# Patient Record
(demographics unavailable — no encounter records)

---

## 2024-10-14 NOTE — ASSESSMENT
[FreeTextEntry1] : Impression soft tissue irritation and soreness potentially related to flareup from physical therapy exercises.  Very complex patient with respect to overlying back and hip issues which seems to be more so back by far than hip and what seems to be exacerbation of her pre-existing neuropathic changes related to the chemotherapy drugs from the past and the current overlying and what seems to be somewhat progressive central canal stenosis.  I did review the images that she had done recently of the MRI with Dr. Martin who does agree that she has relatively significant tight canal stenosis centrally at that level and that it could be contributing to what is going on.  He agreed with me that a potential EMG nerve conduction study would be appropriate and I placed a referral for same.  I suggested she hold off on physical therapy try a short course of Celebrex and see since she is not currently on an anti-inflammatory that adding that to the regimen that she currently is on might be beneficial.  She is certainly willing to try this we will get the EMG and we will follow-up with either Dr. Martin or myself in the coming weeks.  Please excuse any errors in this note is completed with Dragon software.  Time for preparation of this visit was approximately 30 minutes in reviewing her images.  Face-to-face interaction was 30 minutes in documentation 10 minutes so a total of 70 minutes on this patient's visit.

## 2024-10-14 NOTE — HISTORY OF PRESENT ILLNESS
[de-identified] : Keri comes in today as a new patient for the evaluation of left sided hip pain.  She has a very pertinent prior history of evaluations inclusive of her lumbosacral spine as well as her pelvis.  These of both been imaged extensively through the [employer] system and I have access to those.  I spent a significant amount of time prior to seeing the patient reviewing her plain images of of her lumbar spine as well as the MRI images of both the lumbar spine and the pelvis.  The only finding on her pelvis was that of a potential labral fraying or tearing. Very significant multilevel disc degenerative changes noted in the lumbar spine with a comparison made by the radiologist and the reading to prior images that suggest some progression especially the central stenosis at the region of 3 4.  3445 show significant changes as for is anterolisthesis on 5 and there is retrolisthesis at the multiple levels above L4.  None of this is greater than a grade 1 listhesis.  Despite this there is very significant central canal stenosis noted.  The patient does complain not just of some muscular pain overlying the left hip and discomfort in the gluteal origin region of the iliac crest, but also has a remote history of breast cancer subsequent breast cancer treatment with medications that have left her with neuropathy.  She feels that her leg neuropathy has has somewhat progressed and my concern as to whether or not this was somehow linked to the central stenosis potentially contributing to the neuropathy progressing.  She has not had progression in the neuropathy in her hands that she had from the chemotherapy.  She also relays a significant history of multiple falls that she says is related to the neuropathy.  She has been through physical therapy and feels that the therapy may have aggravated the left hip musculature as they are doing a significant amount of relatively high level work using balancing Bosu balls as well as hip abduction exercises with resistance bands and other relatively high-level activities.  She denies any significant current sciatica has been through epidural steroid injections as well as extensive physical therapy thus far and is interested in how she might be treated further.  She is on 60 mg dose of duloxetine daily and takes Lyrica equivalent pregabalin 75 mg twice a day.  We did have a discussion today about the Lyrica and the Lyrica does have a potential higher dose options however there is the slightest concern for combination with duloxetine and Lyrica relating to serotonin syndrome.  She has previously tried gabapentin but from what it sounds like it was only at the 300 mg dose so neither the Lyrica nor gabapentin thus far have been maximized as far as treatment options for her neuropathy.
(1) Other Diagnosis

## 2024-10-14 NOTE — PHYSICAL EXAM
[de-identified] : Physical exam shows a younger appearing than stated age female no acute distress very pleasant interactive and a very good historian.  He has negative straight leg raises some tenderness overlying the left hip musculature extending from the origin of the glue gluteal musculature extending distally towards the tip of the greater trochanter not tender overlying the greater trochanter.  Some of this discomfort and soreness is recreated with some range of motion of the hip which though is maintained and full compared to the right with internal rotation of about 10 and external Tatian about 70 the extremes of rotation do cause her pain.  Distally she is neurovascularly intact from the standpoint of strength however does describe generalized decreased sensation in her feet and lower legs.  Straight leg raise test is negative.

## 2024-10-16 NOTE — ASSESSMENT
[FreeTextEntry1] : The patient is a 66-year-old nulliparous postmenopausal white female of [place] descent. She underwent menopause in 2008 and never took any hormone replacement therapy. She underwent menarche at age 13. She has a family history with her maternal cousin with breast cancer in her 40s and another maternal cousin had uterine cancer at age 59. Her mother had colon cancer at age 70 and a maternal aunt had cervical cancer at age 80. The patient underwent a routine screening mammography November 6, 2021 showing some clustered calcifications measuring about 5 mm in the right breast 2-3 o'clock region which persisted on diagnostic mammography but there were no suspicious ultrasound findings. 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 She had a "stoplight" clip placed at the area of the biopsy. I reviewed her mammo and ultrasound films from November 16, 2021 as well as a biopsy films from November 29, 2021 from [facility] and indeed she did have the suspicious calcifications which were appropriately biopsied. She underwent [employer] genetic panel testing which was negative but she had a VUS in the KIT, MLH1, and RB1 genes. MRI performed on January 3, 2022 showed localized enhancement measuring 1.6 cm in the right breast upper inner quadrant. She was seen by Dr. Shane and underwent neoadjuvant AC-T chemotherapy. Follow-up MRI after neoadjuvant chemotherapy performed on June 22, 2022 showed no residual enhancement. This was a clinical prognostic stage IB breast cancer prior to neoadjuvant chemotherapy. She then underwent a right breast upper inner quadrant partial mastectomy with Megan  localization and right axillary sentinel lymph node biopsy with tissue transfer closure on August 11, 2022. The final pathology showed 1 negative sentinel lymph node and a complete pathologic response with no residual invasive or noninvasive cancer. She followed up with Dr. Shane and underwent external beam radiation with Dr. Lombardi. She underwent her last bilateral mammography and ultrasound which was reviewed from December 11, 2023 performed at [facility] which showed postsurgical scarring changes in the right breast upper inner quadrant and no suspicious findings.  On exam today, she has healed well from her right partial mastectomy and external beam radiation and has typical postsurgical scarring in her right breast after the partial mastectomy with no evidence of recurrence. I would like to see her again in 6 months and April 2025.  Her next bilateral mammography and ultrasound will be due in December 2024 and she was given prescriptions.  She will continue to follow-up with Dr. Shane.

## 2024-10-16 NOTE — HISTORY OF PRESENT ILLNESS
[FreeTextEntry1] : The patient is a 66-year-old nulliparous postmenopausal white female of [place] descent.  She underwent menopause in 2008 and never took any hormone replacement therapy.  She underwent menarche at age 13.  She has a family history with her maternal cousin with breast cancer in her 40s and another maternal cousin had uterine cancer at age 59.  Her mother had colon cancer at age 70 and a maternal aunt had cervical cancer at age 80.  The patient underwent a routine screening mammography November 62,021 showing some clustered calcifications measuring about 5 mm in the right breast 2-3 o'clock region which persisted on diagnostic mammography but there were no suspicious ultrasound findings.  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  She had a "stoplight" clip placed at the area of the biopsy.  She underwent [facility] genetic panel testing which was negative but she had a VUS in the KIT, MLH1, and RB1 genes.  MRI performed on January 3, 2022 showed localized enhancement measuring 1.6 cm in the right breast upper inner quadrant.  She was seen by Dr. Shane and underwent neoadjuvant AC-T chemotherapy.  Follow-up MRI after neoadjuvant chemotherapy performed on June 22, 2022 showed no residual enhancement.  This was a clinical prognostic stage IB breast cancer prior to neoadjuvant chemotherapy.  She then underwent a right breast upper inner quadrant partial mastectomy with Megan  localization and right axillary sentinel lymph node biopsy with tissue transfer closure on August 11, 2022.  The final pathology showed a complete pathologic response with no residual invasive or noninvasive cancer and 1 negative lymph node.  She underwent external beam radiation through Dr. Lombardi.  She comes in now for routine follow-up.

## 2024-10-16 NOTE — ADDENDUM
[FreeTextEntry1] : I spent greater than 75% of the consultation in face-to-face counseling and coordination of care in this patient with a history of a right breast cancer who underwent a partial mastectomy and comes in now for routine breast cancer screening/surveillance.

## 2024-10-16 NOTE — PHYSICAL EXAM
[Normocephalic] : normocephalic [Atraumatic] : atraumatic [EOMI] : extra ocular movement intact [Supple] : supple [No Supraclavicular Adenopathy] : no supraclavicular adenopathy [No Cervical Adenopathy] : no cervical adenopathy [Examined in the supine and seated position] : examined in the supine and seated position [No dominant masses] : no dominant masses in right breast  [No dominant masses] : no dominant masses left breast [No Nipple Retraction] : no left nipple retraction [No Nipple Discharge] : no left nipple discharge [Breast Mass Right Breast ___cm] : no masses [Breast Mass Left Breast ___cm] : no masses [No Axillary Lymphadenopathy] : no left axillary lymphadenopathy [No Edema] : no edema [No Rashes] : no rashes [No Ulceration] : no ulceration [Breast Nipple Inversion] : nipples not inverted [Breast Nipple Retraction] : nipples not retracted [Breast Nipple Flattening] : nipples not flattened [Breast Nipple Fissures] : nipples not fissured [Breast Abnormal Lactation (Galactorrhea)] : no galactorrhea [Breast Abnormal Secretion Bloody Fluid] : no bloody discharge [Breast Abnormal Secretion Serous Fluid] : no serous discharge [Breast Abnormal Secretion Opalescent Fluid] : no milky discharge [de-identified] : On exam, the patient has done well from her right breast partial mastectomy and sentinel lymph node biopsy.  Her wounds have healed well and she has good result after radiation therapy.  She just has some scarring changes and some volume loss which is normal.  She has no evidence of recurrence.  She has no axillary, supraclavicular, or cervical adenopathy. [de-identified] : Status post right breast upper inner quadrant partial mastectomy with sentinel lymph node biopsy.  She underwent radiation therapy and has some slight volume loss and scarring changes but no evidence of recurrence.

## 2024-10-16 NOTE — ASSESSMENT
[FreeTextEntry1] : The patient is a 66-year-old nulliparous postmenopausal white female of [place] descent. She underwent menopause in 2008 and never took any hormone replacement therapy. She underwent menarche at age 13. She has a family history with her maternal cousin with breast cancer in her 40s and another maternal cousin had uterine cancer at age 59. Her mother had colon cancer at age 70 and a maternal aunt had cervical cancer at age 80. The patient underwent a routine screening mammography November 6, 2021 showing some clustered calcifications measuring about 5 mm in the right breast 2-3 o'clock region which persisted on diagnostic mammography but there were no suspicious ultrasound findings. 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 She had a "stoplight" clip placed at the area of the biopsy. I reviewed her mammo and ultrasound films from November 16, 2021 as well as a biopsy films from November 29, 2021 from [facility] and indeed she did have the suspicious calcifications which were appropriately biopsied. She underwent [facility] genetic panel testing which was negative but she had a VUS in the KIT, MLH1, and RB1 genes. MRI performed on January 3, 2022 showed localized enhancement measuring 1.6 cm in the right breast upper inner quadrant. She was seen by Dr. Shane and underwent neoadjuvant AC-T chemotherapy. Follow-up MRI after neoadjuvant chemotherapy performed on June 22, 2022 showed no residual enhancement. This was a clinical prognostic stage IB breast cancer prior to neoadjuvant chemotherapy. She then underwent a right breast upper inner quadrant partial mastectomy with Megan  localization and right axillary sentinel lymph node biopsy with tissue transfer closure on August 11, 2022. The final pathology showed 1 negative sentinel lymph node and a complete pathologic response with no residual invasive or noninvasive cancer. She followed up with Dr. Shane and underwent external beam radiation with Dr. Lombardi. She underwent her last bilateral mammography and ultrasound which was reviewed from December 11, 2023 performed at [facility] which showed postsurgical scarring changes in the right breast upper inner quadrant and no suspicious findings.  On exam today, she has healed well from her right partial mastectomy and external beam radiation and has typical postsurgical scarring in her right breast after the partial mastectomy with no evidence of recurrence. I would like to see her again in 6 months and April 2025.  Her next bilateral mammography and ultrasound will be due in December 2024 and she was given prescriptions.  She will continue to follow-up with Dr. Shane.

## 2024-10-16 NOTE — REASON FOR VISIT
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  This was a clinical prognostic stage IB breast cancer prior to neoadjuvant chemotherapy.  MRI showed the cancer to be localized and she was seen by Dr. Shane and given neoadjuvant AC-T.  Follow-up MRI showed no residual enhancement and she underwent a right breast upper inner quadrant partial mastectomy with Megan  localization and sentinel lymph node biopsy on August 11, 2022.  The final pathology showed a complete pathologic response with 1 negative lymph node.  She underwent external beam radiation and she comes in now for routine follow-up. [FreeTextEntry2] : August 11, 2022

## 2024-10-16 NOTE — PHYSICAL EXAM
[Normocephalic] : normocephalic [Atraumatic] : atraumatic [EOMI] : extra ocular movement intact [Supple] : supple [No Supraclavicular Adenopathy] : no supraclavicular adenopathy [No Cervical Adenopathy] : no cervical adenopathy [Examined in the supine and seated position] : examined in the supine and seated position [No dominant masses] : no dominant masses in right breast  [No dominant masses] : no dominant masses left breast [No Nipple Retraction] : no left nipple retraction [No Nipple Discharge] : no left nipple discharge [Breast Mass Left Breast ___cm] : no masses [Breast Mass Right Breast ___cm] : no masses [No Axillary Lymphadenopathy] : no left axillary lymphadenopathy [No Edema] : no edema [No Rashes] : no rashes [No Ulceration] : no ulceration [Breast Nipple Inversion] : nipples not inverted [Breast Nipple Retraction] : nipples not retracted [Breast Nipple Flattening] : nipples not flattened [Breast Nipple Fissures] : nipples not fissured [Breast Abnormal Lactation (Galactorrhea)] : no galactorrhea [Breast Abnormal Secretion Bloody Fluid] : no bloody discharge [Breast Abnormal Secretion Serous Fluid] : no serous discharge [Breast Abnormal Secretion Opalescent Fluid] : no milky discharge [de-identified] : On exam, the patient has done well from her right breast partial mastectomy and sentinel lymph node biopsy.  Her wounds have healed well and she has good result after radiation therapy.  She just has some scarring changes and some volume loss which is normal.  She has no evidence of recurrence.  She has no axillary, supraclavicular, or cervical adenopathy. [de-identified] : Status post right breast upper inner quadrant partial mastectomy with sentinel lymph node biopsy.  She underwent radiation therapy and has some slight volume loss and scarring changes but no evidence of recurrence.

## 2024-10-16 NOTE — HISTORY OF PRESENT ILLNESS
[FreeTextEntry1] : The patient is a 66-year-old nulliparous postmenopausal white female of [place] descent.  She underwent menopause in 2008 and never took any hormone replacement therapy.  She underwent menarche at age 13.  She has a family history with her maternal cousin with breast cancer in her 40s and another maternal cousin had uterine cancer at age 59.  Her mother had colon cancer at age 70 and a maternal aunt had cervical cancer at age 80.  The patient underwent a routine screening mammography November 62,021 showing some clustered calcifications measuring about 5 mm in the right breast 2-3 o'clock region which persisted on diagnostic mammography but there were no suspicious ultrasound findings.  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  She had a "stoplight" clip placed at the area of the biopsy.  She underwent [facility] genetic panel testing which was negative but she had a VUS in the KIT, MLH1, and RB1 genes.  MRI performed on January 3, 2022 showed localized enhancement measuring 1.6 cm in the right breast upper inner quadrant.  She was seen by Dr. Shane and underwent neoadjuvant AC-T chemotherapy.  Follow-up MRI after neoadjuvant chemotherapy performed on June 22, 2022 showed no residual enhancement.  This was a clinical prognostic stage IB breast cancer prior to neoadjuvant chemotherapy.  She then underwent a right breast upper inner quadrant partial mastectomy with Megan  localization and right axillary sentinel lymph node biopsy with tissue transfer closure on August 11, 2022.  The final pathology showed a complete pathologic response with no residual invasive or noninvasive cancer and 1 negative lymph node.  She underwent external beam radiation through Dr. Lombardi.  She comes in now for routine follow-up.

## 2024-12-19 NOTE — ASSESSMENT
[FreeTextEntry1] : flonase fro chronic cough s/p 2 rounds of steroids and 1 abx course renew cymbalta rnew lyrica with

## 2025-02-05 NOTE — PHYSICAL EXAM
[Fully active, able to carry on all pre-disease performance without restriction] : Status 0 - Fully active, able to carry on all pre-disease performance without restriction [Normal] : affect appropriate [de-identified] : b/l breasts without palpable masses/nodules, no adenopathy, no skin changes [de-identified] : scar to L chest mediport site

## 2025-02-05 NOTE — PHYSICAL EXAM
[Fully active, able to carry on all pre-disease performance without restriction] : Status 0 - Fully active, able to carry on all pre-disease performance without restriction [Normal] : affect appropriate [de-identified] : b/l breasts without palpable masses/nodules, no adenopathy, no skin changes [de-identified] : scar to L chest mediport site

## 2025-02-05 NOTE — REVIEW OF SYSTEMS
[Constipation] : constipation [Difficulty Walking] : difficulty walking [Negative] : Allergic/Immunologic [Fatigue] : fatigue [FreeTextEntry9] : seeing pain management for epidural inejctions [de-identified] : +neuropathy

## 2025-02-05 NOTE — REVIEW OF SYSTEMS
[Constipation] : constipation [Difficulty Walking] : difficulty walking [Negative] : Allergic/Immunologic [Fatigue] : fatigue [FreeTextEntry9] : seeing pain management for epidural inejctions [de-identified] : +neuropathy

## 2025-02-05 NOTE — HISTORY OF PRESENT ILLNESS
[de-identified] : Ms. Brunson is a 64 year old woman who presents for newly diagnosed breast cancer.\par  \par  She underwent routine mammogram screening 2021 which showed some clustered calcifications measuring about 5mm @ 2-3:00 right breast which persisted on diagnostic mammogram, but no suspicious findings on US.\par  Patient had a repeat mammogram and ultrasound: 2021: which presented with a suspicious mass in the right breast\par  \par  2021 Right Breast stereotactic biopsy: IDC grade 3/3, tumor presents in two tissue cores and measures up to 4mm in single core. Focal DCIS, solid patter with high grade nuclei. Microcalcifications associated with invasive carcinoma and DCIS. Triple negative, Ki 67 65%\par  Patient has a MRI on 2022\par  \par  Age of Menarche: 13 years old \par  Age of Menopause: 50, 2008 \par  OCP: younger was on for a couple of years: none now\par  HRT: none\par  \par  \par  Health Maintenance:\par  DEXA 2021\par  Colonoscopy 2020\par  \par  Social hx: \par  smoke: none\par  drinker: social: 1 glass of wine \par  illicit drugs: none\par  \par  Family hx:\par  mother: colon ca: 80s\par  Maternal Aunt: aggressive cervical ca [id number]\par  Maternal Cousin: uterine cancer [id number]\par  Maternal cousin: breast cancer\par   [de-identified] : Patient seen and examined and here today for follow up for the management of stage I triple negative breast cancer and remains on surveillance. +fatigue. Went to ENT for cough did scope told she has slight issue with vagus nerve?. Now stopped coughing. Excessive belching seeing GI 2/2025. Follows with Dr. Mobley . Continues to follow with Ortho. Continues to follow with Dr. Snell.   Mammo/Sono 12/2024 BIRADS 2 DEXA 8/2023 due 8/2025 CNY due 7/2023 with GI Dr. Monteiro - no abnormalities repeat in 2026 due to family history  GYN UTD

## 2025-02-05 NOTE — ASSESSMENT
[Patient/Caregiver unclear of wishes] : Patient/Caregiver unclear of wishes [Full Code] : full code [FreeTextEntry1] : Ms. Brunson is a 66 year old female with a IDC, ER -/NH-/HER2-, (TNM Staging - Prognostically and pathologically ) Stage I, s/p lumpectomy, s/p RT on surveillance.  #Breast Cancer - IDC, ER -/NH-/HER2-, (TNM Staging - Prognostically and pathologically ) - Stage I - We have reviewed the diagnosis, prognosis and natural history of triple negative breast CA. -We have reviewed the imaging and pathology personally and with the patient. -Mammo - 5 mm, pending MRI - If <1 cm recommend lumpectomy followed by adjuvant chemotherapy in the form of ddAC -->T - If BRCA positive would recommend bilateral mastectomy and chemo with ddAC and consider adding Carboplatin to Taxol. - I have recommended evaluation by our genetic counselor for genetic testing. - She will need a baseline echo and port placement. - We have reviewed the regimen of Adriamycin 60 mg/m2 and cytoxan 600 mg/m2 q 2 weeks for 4 cycles with onpro 6mg for bone marrow convalescence. This will be followed by Taxol 80 mg/m2 weekly x 12 doses. We have reviewed the side effects to include but are not limited to cytopenias with increased risk of infection, sepsis and even death, N/V, Fatigue, alopecia, <1% chance of leukemia in the future and neuropathy. - Since AC is high emetogenic risk will give Aloxi 0.25 mg IV, Emend 150 mg IV and decadron 10 mg IV with each cycle. - Will refer to radiation oncology as we get closer to finishing chemo. - 2/7/22 - vs and labs reviewed. received port, echo - EF 71% no wall motion abnormalities. ok to proceed with first cycle of ddAC --> onpro - 2/14/22 - vs and labs reviewed - counts low due to chemo. complains of nausea - will give compazine - 2/22/2022 Cleared for Cycle 2 AC - 2/28/22 - vs and labs reviewed. neutropenia noted. Had onpro patch go off. Genetic testing reviewed. 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 Advised she can contact Morales for further questions. Discussed that this will not  - 3/7/2022 Cycle 3 AC, vs and labs reviewed - 3/14/2022 Tox check - 3/21/2022 vs and labs reviewed AC #4 - 3/25/2022 tox check' patient educated about taking prednisone night before starting taxol in 2 weeks - 4/4/22 - vs and labs reviewed. proceed with first cycle of taxol. switched prednisone to decadron - 4/12/22 - vs and labs reviewed. proceed with second cycle of taxol. will decrease decadron to 2 pills daily if no reaction today. - 4/18/22 - vs and labs reviewed. proceed with third cycle of taxol. decadron to 2 pills - 4/25/22 - vs and labs reviewed. hold cycle 4 due to bronchitis. on augmentin - continue for 7 days. take zyertc, benzonate PRN and Robotussin DM. - 5/2/22 - vs and labs reviewed - cycle 4 of taxol today. completed augmentin. Cough medicine with codeine helps. continue miralax for constipation. - 5/9/22 - vs and labs reviewed. cycle 5 of taxol today. - 5/16/22 - vs and labs reviewed. cycle 6 of taxol today. - 5/23/22 - vs and labs reviewed. cycle 7 of taxol. hgb 7.2, she is symptomatic and I recommend 1 unit prbcs - 5/31/2022 vs and labs reviewed, Cycle 8 taxol - 6/6/2022 vsand labs reviewed, Cycle 9 taxol - 6/13/22 - vs and labs reviewed. proceed with cycle 10 of taxol. will send back to Dr. Snell. Needs post treatment MRI - Dr. Snell to order - 6/20/2022 vs and CBC reviewed, proceed with taxol #11; appt with Dr. Snell and MRI 6/28/2022 - 6/27/22 - vs and labs reviewed. will proceed with cycle 12. MRI breast reviewed - showed radiographic complete response. Sees Dr. Snell Tomorrow - likely a candidate for right breast partial mastectomy and sentinel lymph node biopsy followed by RT. Will see patient post up to see if there is any residual disease and if there is will discuss xeloda - 7/6/22 - vs and labs reviewed. continues with numbness and tingling - renewing Gabapentin - advised to take 3 at bedtime. surgery scheduled for 8/5/22. Port will be removed Friday. Will send for radiation oncology referral - 8/22/2022 vs and cbc reviewed, path reviewed 1 negative lymph node and complete response without any residual disease. she will follow up with Dr. Lombardi for XRT - 10/10 - vs reviewed. labs drawn in office today. hgb 11.7 improving - monitor, wbc wnl, plts wnl, LFTs and Kidney function and electrolytes wnl. RT completed. Sees Dr. Snell 11/1/22. - 2/1/23 - vs reviewed. labs drawn in office today. wbc, hgb and plts wnl. Had port scar revision with Dr. Santana - happy with results. Mammo/sono - no mammographic or sonographic evidence of malignancy. Saw Audrey Peña - 11/1/22 - reviewed note, saw Dr. Lombardi 11/9/22 - reviewed. Needs repeat mammo/sono in 12/23. - 2/24/2023 vs and CBC reviewed; WBC 4.7, hgb 12.4, plt. Right breast diagnostic US ordered, will likely need biopsy. Dr. Snell notified of new finding - 2/27/23 patient was seen in follow up by Dr. Snell due to concern for R beast finding on exam. Dr. Snell's note reviewed stating "She came in today for an interval follow-up after the nurse practitioner down in Dr. Shane's office felt a questionable right breast 12:00 density. On exam today, she has healed well from her right partial mastectomy and external beam radiation in this density which is being felt in the upper and inner aspect of the right breast to just postsurgical in nature related to the surgical defect after the partial mastectomy and is not clinically concerning" - 5/31/23 vs and CBC reviewed; WBC 4.39, hgb 13, plt 255. Continue follow up with Rad Onc and breast surgery last seen 4/2023, notes reviewed. Due for routine breast imaging 12/2023, orders in placed by breast surgery. -9/8/23 vs reviewed. plan to see Dr. Snell next month. saw Dr. Lombardi on 8/23 - reviewed note. Mammo/sono 12/23. 1/24/24 - vs and labs reviewed. labs drawn in office today. wbc 4.29, hgb 11.8 plts wnl. mammo/sono - 12/23 - reviewed - no evidence of malignancy - repeat 12/2024. Follow up with Dr. Snell 4/2024 7/24 - vs and labs reviewed. labs drawn in office today. wbc, hgb adn ptls wnl. mammo/sono due 12/24. Sees Dr. Lombardi in 9/24, Dr. Snell in 10/24 1/2025 vs and labs reviewed' remains on surveillance s/p mammo/sono 12/2024 BIRADS 2 continue follow upw ith Dr. Snell last seen 10/2024 note reviewe d  #Neuropathy - Gabapentin did not give her relief - Currently on lyrica 75 mg BID and cymbalta 60 mg daily.  - Following with Dr. sauceda   #Osteopenia -DEXA 8/2023 - normal bone density due 8/2025  #Constipation - Miralax prn. Stop for diarrhea  #anemia worsening now hgb 11.6, check irons   #Health Maintenance - Mammo/Sono 12/2024 - DEXA  8/2023 due 8/2025 - [facility] UTD - seeing GI 2/2025  - GYN UTD   RTC in 6 mos with CBC with diff, CMP, vit D, irons, B12/folate. [AdvancecareDate] : 01/24/2024

## 2025-02-05 NOTE — ASSESSMENT
[Patient/Caregiver unclear of wishes] : Patient/Caregiver unclear of wishes [Full Code] : full code [FreeTextEntry1] : Ms. Brunson is a 66 year old female with a IDC, ER -/VT-/HER2-, (TNM Staging - Prognostically and pathologically ) Stage I, s/p lumpectomy, s/p RT on surveillance.  #Breast Cancer - IDC, ER -/VT-/HER2-, (TNM Staging - Prognostically and pathologically ) - Stage I - We have reviewed the diagnosis, prognosis and natural history of triple negative breast CA. -We have reviewed the imaging and pathology personally and with the patient. -Mammo - 5 mm, pending MRI - If <1 cm recommend lumpectomy followed by adjuvant chemotherapy in the form of ddAC -->T - If BRCA positive would recommend bilateral mastectomy and chemo with ddAC and consider adding Carboplatin to Taxol. - I have recommended evaluation by our genetic counselor for genetic testing. - She will need a baseline echo and port placement. - We have reviewed the regimen of Adriamycin 60 mg/m2 and cytoxan 600 mg/m2 q 2 weeks for 4 cycles with onpro 6mg for bone marrow convalescence. This will be followed by Taxol 80 mg/m2 weekly x 12 doses. We have reviewed the side effects to include but are not limited to cytopenias with increased risk of infection, sepsis and even death, N/V, Fatigue, alopecia, <1% chance of leukemia in the future and neuropathy. - Since AC is high emetogenic risk will give Aloxi 0.25 mg IV, Emend 150 mg IV and decadron 10 mg IV with each cycle. - Will refer to radiation oncology as we get closer to finishing chemo. - 2/7/22 - vs and labs reviewed. received port, echo - EF 71% no wall motion abnormalities. ok to proceed with first cycle of ddAC --> onpro - 2/14/22 - vs and labs reviewed - counts low due to chemo. complains of nausea - will give compazine - 2/22/2022 Cleared for Cycle 2 AC - 2/28/22 - vs and labs reviewed. neutropenia noted. Had onpro patch go off. Genetic testing reviewed. 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 Advised she can contact Morales for further questions. Discussed that this will not  - 3/7/2022 Cycle 3 AC, vs and labs reviewed - 3/14/2022 Tox check - 3/21/2022 vs and labs reviewed AC #4 - 3/25/2022 tox check' patient educated about taking prednisone night before starting taxol in 2 weeks - 4/4/22 - vs and labs reviewed. proceed with first cycle of taxol. switched prednisone to decadron - 4/12/22 - vs and labs reviewed. proceed with second cycle of taxol. will decrease decadron to 2 pills daily if no reaction today. - 4/18/22 - vs and labs reviewed. proceed with third cycle of taxol. decadron to 2 pills - 4/25/22 - vs and labs reviewed. hold cycle 4 due to bronchitis. on augmentin - continue for 7 days. take zyertc, benzonate PRN and Robotussin DM. - 5/2/22 - vs and labs reviewed - cycle 4 of taxol today. completed augmentin. Cough medicine with codeine helps. continue miralax for constipation. - 5/9/22 - vs and labs reviewed. cycle 5 of taxol today. - 5/16/22 - vs and labs reviewed. cycle 6 of taxol today. - 5/23/22 - vs and labs reviewed. cycle 7 of taxol. hgb 7.2, she is symptomatic and I recommend 1 unit prbcs - 5/31/2022 vs and labs reviewed, Cycle 8 taxol - 6/6/2022 vsand labs reviewed, Cycle 9 taxol - 6/13/22 - vs and labs reviewed. proceed with cycle 10 of taxol. will send back to Dr. Snell. Needs post treatment MRI - Dr. Snell to order - 6/20/2022 vs and CBC reviewed, proceed with taxol #11; appt with Dr. Snell and MRI 6/28/2022 - 6/27/22 - vs and labs reviewed. will proceed with cycle 12. MRI breast reviewed - showed radiographic complete response. Sees Dr. Snell Tomorrow - likely a candidate for right breast partial mastectomy and sentinel lymph node biopsy followed by RT. Will see patient post up to see if there is any residual disease and if there is will discuss xeloda - 7/6/22 - vs and labs reviewed. continues with numbness and tingling - renewing Gabapentin - advised to take 3 at bedtime. surgery scheduled for 8/5/22. Port will be removed Friday. Will send for radiation oncology referral - 8/22/2022 vs and cbc reviewed, path reviewed 1 negative lymph node and complete response without any residual disease. she will follow up with Dr. Lombardi for XRT - 10/10 - vs reviewed. labs drawn in office today. hgb 11.7 improving - monitor, wbc wnl, plts wnl, LFTs and Kidney function and electrolytes wnl. RT completed. Sees Dr. Snell 11/1/22. - 2/1/23 - vs reviewed. labs drawn in office today. wbc, hgb and plts wnl. Had port scar revision with Dr. Santana - happy with results. Mammo/sono - no mammographic or sonographic evidence of malignancy. Saw Audrey Peña - 11/1/22 - reviewed note, saw Dr. Lombardi 11/9/22 - reviewed. Needs repeat mammo/sono in 12/23. - 2/24/2023 vs and CBC reviewed; WBC 4.7, hgb 12.4, plt. Right breast diagnostic US ordered, will likely need biopsy. Dr. Snell notified of new finding - 2/27/23 patient was seen in follow up by Dr. Snell due to concern for R beast finding on exam. Dr. Snell's note reviewed stating "She came in today for an interval follow-up after the nurse practitioner down in Dr. Shane's office felt a questionable right breast 12:00 density. On exam today, she has healed well from her right partial mastectomy and external beam radiation in this density which is being felt in the upper and inner aspect of the right breast to just postsurgical in nature related to the surgical defect after the partial mastectomy and is not clinically concerning" - 5/31/23 vs and CBC reviewed; WBC 4.39, hgb 13, plt 255. Continue follow up with Rad Onc and breast surgery last seen 4/2023, notes reviewed. Due for routine breast imaging 12/2023, orders in placed by breast surgery. -9/8/23 vs reviewed. plan to see Dr. Snell next month. saw Dr. Lombardi on 8/23 - reviewed note. Mammo/sono 12/23. 1/24/24 - vs and labs reviewed. labs drawn in office today. wbc 4.29, hgb 11.8 plts wnl. mammo/sono - 12/23 - reviewed - no evidence of malignancy - repeat 12/2024. Follow up with Dr. Snell 4/2024 7/24 - vs and labs reviewed. labs drawn in office today. wbc, hgb adn ptls wnl. mammo/sono due 12/24. Sees Dr. Lombardi in 9/24, Dr. Snell in 10/24 1/2025 vs and labs reviewed' remains on surveillance s/p mammo/sono 12/2024 BIRADS 2 continue follow upw ith Dr. Snell last seen 10/2024 note reviewe d  #Neuropathy - Gabapentin did not give her relief - Currently on lyrica 75 mg BID and cymbalta 60 mg daily.  - Following with Dr. sauceda   #Osteopenia -DEXA 8/2023 - normal bone density due 8/2025  #Constipation - Miralax prn. Stop for diarrhea  #anemia worsening now hgb 11.6, check irons   #Health Maintenance - Mammo/Sono 12/2024 - DEXA  8/2023 due 8/2025 - [facility] UTD - seeing GI 2/2025  - GYN UTD   RTC in 6 mos with CBC with diff, CMP, vit D, irons, B12/folate. [AdvancecareDate] : 01/24/2024

## 2025-02-05 NOTE — HISTORY OF PRESENT ILLNESS
[de-identified] : Ms. Brunson is a 64 year old woman who presents for newly diagnosed breast cancer.\par  \par  She underwent routine mammogram screening 2021 which showed some clustered calcifications measuring about 5mm @ 2-3:00 right breast which persisted on diagnostic mammogram, but no suspicious findings on US.\par  Patient had a repeat mammogram and ultrasound: 2021: which presented with a suspicious mass in the right breast\par  \par  2021 Right Breast stereotactic biopsy: IDC grade 3/3, tumor presents in two tissue cores and measures up to 4mm in single core. Focal DCIS, solid patter with high grade nuclei. Microcalcifications associated with invasive carcinoma and DCIS. Triple negative, Ki 67 65%\par  Patient has a MRI on 2022\par  \par  Age of Menarche: 13 years old \par  Age of Menopause: 50, 2008 \par  OCP: younger was on for a couple of years: none now\par  HRT: none\par  \par  \par  Health Maintenance:\par  DEXA 2021\par  Colonoscopy 2020\par  \par  Social hx: \par  smoke: none\par  drinker: social: 1 glass of wine \par  illicit drugs: none\par  \par  Family hx:\par  mother: colon ca: 80s\par  Maternal Aunt: aggressive cervical ca [id number]\par  Maternal Cousin: uterine cancer [id number]\par  Maternal cousin: breast cancer\par   [de-identified] : Patient seen and examined and here today for follow up for the management of stage I triple negative breast cancer and remains on surveillance. +fatigue. Went to ENT for cough did scope told she has slight issue with vagus nerve?. Now stopped coughing. Excessive belching seeing GI 2/2025. Follows with Dr. Mobley . Continues to follow with Ortho. Continues to follow with Dr. Snell.   Mammo/Sono 12/2024 BIRADS 2 DEXA 8/2023 due 8/2025 [facility] due 7/2023 with GI Dr. Monteiro - no abnormalities repeat in 2026 due to family history  GYN UTD

## 2025-04-21 NOTE — HISTORY OF PRESENT ILLNESS
[FreeTextEntry1] : The patient is a 66-year-old nulliparous postmenopausal white female of [place] descent.  She underwent menopause in 2008 and never took any hormone replacement therapy.  She underwent menarche at age 13.  She has a family history with her maternal cousin with breast cancer in her 40s and another maternal cousin had uterine cancer at age 59.  Her mother had colon cancer at age 70 and a maternal aunt had cervical cancer at age 80.  The patient underwent a routine screening mammography November 62,021 showing some clustered calcifications measuring about 5 mm in the right breast 2-3 o'clock region which persisted on diagnostic mammography but there were no suspicious ultrasound findings.  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  She had a "stoplight" clip placed at the area of the biopsy.  She underwent [facility] genetic panel testing which was negative but she had a VUS in the KIT, MLH1, and RB1 genes.  MRI performed on January 3, 2022 showed localized enhancement measuring 1.6 cm in the right breast upper inner quadrant.  She was seen by Dr. Shane and underwent neoadjuvant AC-T chemotherapy.  Follow-up MRI after neoadjuvant chemotherapy performed on June 22, 2022 showed no residual enhancement.  This was a clinical prognostic stage IB breast cancer prior to neoadjuvant chemotherapy.  She then underwent a right breast upper inner quadrant partial mastectomy with Megan  localization and right axillary sentinel lymph node biopsy with tissue transfer closure on August 11, 2022.  The final pathology showed a complete pathologic response with no residual invasive or noninvasive cancer and 1 negative lymph node.  She underwent external beam radiation through Dr. Lombardi.  She comes in now for routine follow-up.

## 2025-04-21 NOTE — HISTORY OF PRESENT ILLNESS
[FreeTextEntry1] : The patient is a 66-year-old nulliparous postmenopausal white female of [place] descent.  She underwent menopause in 2008 and never took any hormone replacement therapy.  She underwent menarche at age 13.  She has a family history with her maternal cousin with breast cancer in her 40s and another maternal cousin had uterine cancer at age 59.  Her mother had colon cancer at age 70 and a maternal aunt had cervical cancer at age 80.  The patient underwent a routine screening mammography November 62,021 showing some clustered calcifications measuring about 5 mm in the right breast 2-3 o'clock region which persisted on diagnostic mammography but there were no suspicious ultrasound findings.  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  She had a "stoplight" clip placed at the area of the biopsy.  She underwent [facility] genetic panel testing which was negative but she had a VUS in the KIT, MLH1, and RB1 genes.  MRI performed on January 3, 2022 showed localized enhancement measuring 1.6 cm in the right breast upper inner quadrant.  She was seen by Dr. Shane and underwent neoadjuvant AC-T chemotherapy.  Follow-up MRI after neoadjuvant chemotherapy performed on June 22, 2022 showed no residual enhancement.  This was a clinical prognostic stage IB breast cancer prior to neoadjuvant chemotherapy.  She then underwent a right breast upper inner quadrant partial mastectomy with Megan  localization and right axillary sentinel lymph node biopsy with tissue transfer closure on August 11, 2022.  The final pathology showed a complete pathologic response with no residual invasive or noninvasive cancer and 1 negative lymph node.  She underwent external beam radiation through Dr. Lombardi.  She comes in now for routine follow-up.

## 2025-04-21 NOTE — ASSESSMENT
[FreeTextEntry1] : The patient is a 66-year-old nulliparous postmenopausal white female of [place] descent. She underwent menopause in 2008 and never took any hormone replacement therapy. She underwent menarche at age 13. She has a family history with her maternal cousin with breast cancer in her 40s and another maternal cousin had uterine cancer at age 59. Her mother had colon cancer at age 70 and a maternal aunt had cervical cancer at age 80. The patient underwent a routine screening mammography November 6, 2021 showing some clustered calcifications measuring about 5 mm in the right breast 2-3 o'clock region which persisted on diagnostic mammography but there were no suspicious ultrasound findings. 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 She had a "stoplight" clip placed at the area of the biopsy. I reviewed her mammo and ultrasound films from November 16, 2021 as well as a biopsy films from November 29, 2021 from [facility] and indeed she did have the suspicious calcifications which were appropriately biopsied. She underwent [facility] genetic panel testing which was negative but she had a VUS in the KIT, MLH1, and RB1 genes. MRI performed on January 3, 2022 showed localized enhancement measuring 1.6 cm in the right breast upper inner quadrant. She was seen by Dr. Shane and underwent neoadjuvant AC-T chemotherapy. Follow-up MRI after neoadjuvant chemotherapy performed on June 22, 2022 showed no residual enhancement. This was a clinical prognostic stage IB breast cancer prior to neoadjuvant chemotherapy. She then underwent a right breast upper inner quadrant partial mastectomy with Megan  localization and right axillary sentinel lymph node biopsy with tissue transfer closure on August 11, 2022. The final pathology showed 1 negative sentinel lymph node and a complete pathologic response with no residual invasive or noninvasive cancer. She followed up with Dr. Shane and underwent external beam radiation with Dr. Lombardi. She underwent her last bilateral mammography and ultrasound which was reviewed from December 16, 2024 performed at [facility] which showed postsurgical scarring changes in the right breast upper inner quadrant and no suspicious findings.  On exam today, she healed well from her right partial mastectomy and external beam radiation and has typical postsurgical scarring in her right breast with no evidence of recurrence. I would like to see her again in 6 months in October 2025.  Her next bilateral mammography and ultrasound will be due in December 2025 and she was given prescriptions.  She will continue to follow-up with Dr. Shane.

## 2025-04-21 NOTE — ASSESSMENT
[FreeTextEntry1] : The patient is a 66-year-old nulliparous postmenopausal white female of [place] descent. She underwent menopause in 2008 and never took any hormone replacement therapy. She underwent menarche at age 13. She has a family history with her maternal cousin with breast cancer in her 40s and another maternal cousin had uterine cancer at age 59. Her mother had colon cancer at age 70 and a maternal aunt had cervical cancer at age 80. The patient underwent a routine screening mammography November 6, 2021 showing some clustered calcifications measuring about 5 mm in the right breast 2-3 o'clock region which persisted on diagnostic mammography but there were no suspicious ultrasound findings. 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 She had a "stoplight" clip placed at the area of the biopsy. I reviewed her mammo and ultrasound films from November 16, 2021 as well as a biopsy films from November 29, 2021 from [facility] and indeed she did have the suspicious calcifications which were appropriately biopsied. She underwent [employer] genetic panel testing which was negative but she had a VUS in the KIT, MLH1, and RB1 genes. MRI performed on January 3, 2022 showed localized enhancement measuring 1.6 cm in the right breast upper inner quadrant. She was seen by Dr. Shane and underwent neoadjuvant AC-T chemotherapy. Follow-up MRI after neoadjuvant chemotherapy performed on June 22, 2022 showed no residual enhancement. This was a clinical prognostic stage IB breast cancer prior to neoadjuvant chemotherapy. She then underwent a right breast upper inner quadrant partial mastectomy with Megan  localization and right axillary sentinel lymph node biopsy with tissue transfer closure on August 11, 2022. The final pathology showed 1 negative sentinel lymph node and a complete pathologic response with no residual invasive or noninvasive cancer. She followed up with Dr. Shane and underwent external beam radiation with Dr. Lombardi. She underwent her last bilateral mammography and ultrasound which was reviewed from December 16, 2024 performed at [facility] which showed postsurgical scarring changes in the right breast upper inner quadrant and no suspicious findings.  On exam today, she healed well from her right partial mastectomy and external beam radiation and has typical postsurgical scarring in her right breast with no evidence of recurrence. I would like to see her again in 6 months in October 2025.  Her next bilateral mammography and ultrasound will be due in December 2025 and she was given prescriptions.  She will continue to follow-up with Dr. Shane.

## 2025-04-21 NOTE — PHYSICAL EXAM
[Normocephalic] : normocephalic [Atraumatic] : atraumatic [EOMI] : extra ocular movement intact [Supple] : supple [No Supraclavicular Adenopathy] : no supraclavicular adenopathy [No Cervical Adenopathy] : no cervical adenopathy [Examined in the supine and seated position] : examined in the supine and seated position [No dominant masses] : no dominant masses in right breast  [No dominant masses] : no dominant masses left breast [No Nipple Retraction] : no left nipple retraction [No Nipple Discharge] : no left nipple discharge [Breast Mass Right Breast ___cm] : no masses [Breast Mass Left Breast ___cm] : no masses [No Axillary Lymphadenopathy] : no left axillary lymphadenopathy [No Edema] : no edema [No Rashes] : no rashes [No Ulceration] : no ulceration [Breast Nipple Inversion] : nipples not inverted [Breast Nipple Retraction] : nipples not retracted [Breast Nipple Flattening] : nipples not flattened [Breast Nipple Fissures] : nipples not fissured [Breast Abnormal Lactation (Galactorrhea)] : no galactorrhea [Breast Abnormal Secretion Bloody Fluid] : no bloody discharge [Breast Abnormal Secretion Serous Fluid] : no serous discharge [Breast Abnormal Secretion Opalescent Fluid] : no milky discharge [de-identified] : On exam, the patient has done well from her right breast partial mastectomy and sentinel lymph node biopsy.  Her wounds have healed well and she has good result after radiation therapy.  She just has some scarring changes and some volume loss which is normal.  She has no evidence of recurrence.  She has no axillary, supraclavicular, or cervical adenopathy. [de-identified] : Status post right breast upper inner quadrant partial mastectomy with sentinel lymph node biopsy.  She underwent radiation therapy and has some slight volume loss and scarring changes but no evidence of recurrence.

## 2025-04-21 NOTE — PHYSICAL EXAM
[Normocephalic] : normocephalic [Atraumatic] : atraumatic [EOMI] : extra ocular movement intact [Supple] : supple [No Supraclavicular Adenopathy] : no supraclavicular adenopathy [No Cervical Adenopathy] : no cervical adenopathy [Examined in the supine and seated position] : examined in the supine and seated position [No dominant masses] : no dominant masses in right breast  [No dominant masses] : no dominant masses left breast [No Nipple Retraction] : no left nipple retraction [No Nipple Discharge] : no left nipple discharge [Breast Mass Right Breast ___cm] : no masses [Breast Mass Left Breast ___cm] : no masses [No Axillary Lymphadenopathy] : no left axillary lymphadenopathy [No Edema] : no edema [No Rashes] : no rashes [No Ulceration] : no ulceration [Breast Nipple Inversion] : nipples not inverted [Breast Nipple Retraction] : nipples not retracted [Breast Nipple Flattening] : nipples not flattened [Breast Nipple Fissures] : nipples not fissured [Breast Abnormal Lactation (Galactorrhea)] : no galactorrhea [Breast Abnormal Secretion Bloody Fluid] : no bloody discharge [Breast Abnormal Secretion Serous Fluid] : no serous discharge [Breast Abnormal Secretion Opalescent Fluid] : no milky discharge [de-identified] : On exam, the patient has done well from her right breast partial mastectomy and sentinel lymph node biopsy.  Her wounds have healed well and she has good result after radiation therapy.  She just has some scarring changes and some volume loss which is normal.  She has no evidence of recurrence.  She has no axillary, supraclavicular, or cervical adenopathy. [de-identified] : Status post right breast upper inner quadrant partial mastectomy with sentinel lymph node biopsy.  She underwent radiation therapy and has some slight volume loss and scarring changes but no evidence of recurrence.